# Patient Record
Sex: FEMALE | Race: WHITE | NOT HISPANIC OR LATINO | URBAN - METROPOLITAN AREA
[De-identification: names, ages, dates, MRNs, and addresses within clinical notes are randomized per-mention and may not be internally consistent; named-entity substitution may affect disease eponyms.]

---

## 2018-06-27 ENCOUNTER — APPOINTMENT (RX ONLY)
Dept: URBAN - METROPOLITAN AREA CLINIC 51 | Facility: CLINIC | Age: 38
Setting detail: DERMATOLOGY
End: 2018-06-27

## 2018-06-27 DIAGNOSIS — I78.8 OTHER DISEASES OF CAPILLARIES: ICD-10-CM

## 2018-06-27 DIAGNOSIS — L71.8 OTHER ROSACEA: ICD-10-CM

## 2018-06-27 PROBLEM — J30.1 ALLERGIC RHINITIS DUE TO POLLEN: Status: ACTIVE | Noted: 2018-06-27

## 2018-06-27 PROBLEM — E03.9 HYPOTHYROIDISM, UNSPECIFIED: Status: ACTIVE | Noted: 2018-06-27

## 2018-06-27 PROBLEM — L20.84 INTRINSIC (ALLERGIC) ECZEMA: Status: ACTIVE | Noted: 2018-06-27

## 2018-06-27 PROCEDURE — 99201: CPT

## 2018-06-27 PROCEDURE — ? COUNSELING

## 2018-06-27 ASSESSMENT — LOCATION SIMPLE DESCRIPTION DERM
LOCATION SIMPLE: RIGHT CHEEK
LOCATION SIMPLE: LEFT FOREHEAD

## 2018-06-27 ASSESSMENT — LOCATION ZONE DERM: LOCATION ZONE: FACE

## 2018-06-27 ASSESSMENT — LOCATION DETAILED DESCRIPTION DERM
LOCATION DETAILED: LEFT FOREHEAD
LOCATION DETAILED: RIGHT CENTRAL MALAR CHEEK

## 2018-06-27 ASSESSMENT — SEVERITY ASSESSMENT OVERALL AMONG ALL PATIENTS
IN YOUR EXPERIENCE, AMONG ALL PATIENTS YOU HAVE SEEN WITH THIS CONDITION, HOW SEVERE IS THIS PATIENT'S CONDITION?: MODERATE

## 2022-06-21 ENCOUNTER — HOSPITAL ENCOUNTER (OUTPATIENT)
Dept: HOSPITAL 75 - PREOP | Age: 42
Discharge: HOME | End: 2022-06-21
Attending: SURGERY
Payer: COMMERCIAL

## 2022-06-21 VITALS — BODY MASS INDEX: 27.32 KG/M2 | HEIGHT: 65.98 IN | WEIGHT: 169.98 LBS

## 2022-06-21 DIAGNOSIS — Z01.818: Primary | ICD-10-CM

## 2022-06-22 ENCOUNTER — HOSPITAL ENCOUNTER (OUTPATIENT)
Dept: HOSPITAL 75 - ENDO | Age: 42
Discharge: HOME | End: 2022-06-22
Attending: SURGERY
Payer: COMMERCIAL

## 2022-06-22 VITALS — DIASTOLIC BLOOD PRESSURE: 54 MMHG | SYSTOLIC BLOOD PRESSURE: 93 MMHG

## 2022-06-22 VITALS — SYSTOLIC BLOOD PRESSURE: 113 MMHG | DIASTOLIC BLOOD PRESSURE: 78 MMHG

## 2022-06-22 VITALS — BODY MASS INDEX: 27.32 KG/M2 | WEIGHT: 169.98 LBS | HEIGHT: 65.98 IN

## 2022-06-22 VITALS — DIASTOLIC BLOOD PRESSURE: 87 MMHG | SYSTOLIC BLOOD PRESSURE: 124 MMHG

## 2022-06-22 VITALS — DIASTOLIC BLOOD PRESSURE: 57 MMHG | SYSTOLIC BLOOD PRESSURE: 98 MMHG

## 2022-06-22 VITALS — SYSTOLIC BLOOD PRESSURE: 98 MMHG | DIASTOLIC BLOOD PRESSURE: 63 MMHG

## 2022-06-22 DIAGNOSIS — K29.30: ICD-10-CM

## 2022-06-22 DIAGNOSIS — K44.9: ICD-10-CM

## 2022-06-22 DIAGNOSIS — K31.89: Primary | ICD-10-CM

## 2022-06-22 DIAGNOSIS — K21.00: ICD-10-CM

## 2022-06-22 DIAGNOSIS — Z79.899: ICD-10-CM

## 2022-06-22 DIAGNOSIS — K57.30: ICD-10-CM

## 2022-06-22 PROCEDURE — 84703 CHORIONIC GONADOTROPIN ASSAY: CPT

## 2022-06-22 NOTE — DISCHARGE INST-SURGICAL
D/C Lap Instructions-AYANA


Follow Up





Activity as tolerated








High Fiber Diet 25g or more per day





Avoid Alcohol, Caffeine, Spicy Greasy and Acid foods.





Drink 64 fluid oz or more of fluids per day.





Symptoms to Report: Fever over 101 degree F, Nausea/Vomiting 


If any problems/questions: Contact your physician or go to Emergency Room











BLAISE PIÑA MD                Jun 22, 2022 12:10

## 2022-06-22 NOTE — ANESTHESIA-GENERAL POST-OP
MAC


Patient Condition


Mental Status/LOC:  Same as Preop


Cardiovascular:  Satisfactory


Nausea/Vomiting:  Absent


Respiratory:  Satisfactory


Pain:  Controlled


Complications:  Absent





Post Op Complications


Complications


None





Follow Up Care/Instructions


Patient Instructions


None needed.





Anesthesiology Discharge Order


Discharge Order


Patient is doing well, no complaints, stable vital signs, no apparent adverse 

anesthesia problems.   


No complications reported per nursing.











LATISHA MESSER CRNA              Jun 22, 2022 14:20

## 2022-06-22 NOTE — HISTORY AND PHYSICAL
DATE OF SERVICE:  



DATE OF ADMISSION:

06/22/2022.



ATTENDING PRIMARY CARE PHYSICIAN:

Angelita Rose DO.



HISTORY OF PRESENT ILLNESS:

The patient is a 41-year-old female who presented to [facility] with a 2-day

history of right lower abdominal quadrant pain.  She reports that this increased

in severity over time and she thought that this could be appendicitis.  A CT

scan was performed, which did show a noncomplicated diverticulitis around the

transverse colon.  She also had a mildly elevated white count of 12,000.  She

was admitted and started on IV antibiotics for approximately 48 hours and did

well and was discharged home.  She has not had a colonoscopy up to this point in

her life.  Upon further questioning, she also reports that she has had a history

of gastroesophageal reflux disease for many years; however, this has also been

worsening and states that she does have frequent episodes of belching as well as

a burning pain in the epigastric region.



PAST MEDICAL HISTORY:

Hypothyroid.



PAST SURGICAL HISTORY:

ORIF right wrist.



ALLERGIES:

CODEINE.



MEDICATIONS:

Levothyroxine 75 mcg daily, ciprofloxacin, and Flagyl b.i.d.



SOCIAL HISTORY:

Negative smoke and negative alcohol.



FAMILY HISTORY:

Noncontributory.



REVIEW OF SYSTEMS:

A well-nourished female, currently in no acute distress.  She is not

experiencing any shortness of breath or difficulty breathing.  No chest pain,

palpitations or diaphoresis.  No nausea, vomiting with a history of pain in the

right lower abdominal quadrant approximately two weeks ago; however, after

treatment, this has gone away completely.  She does not report any red blood per

rectum nor any dark tarry stools.  She also does not report any nausea or

vomiting; however, does have frequent episodes of reflux with epigastric burning

sensation.  No hematemesis and no coffee ground emesis.  No fever, chills, no

recent inadvertent weight loss.  All other review of systems negative.



PHYSICAL EXAMINATION:

CHEST:  Clear.  Good breath sounds bilaterally.

HEART:  Regular, no murmurs.

EXTREMITIES:  No lower extremity edema and negative Homans sign.

HEENT:  No scleral icterus.

NECK:  No cervical lymphadenopathy.

ABDOMEN:  Soft, nontender, and nondistended.

SKIN:  Warm and dry.



ASSESSMENT AND PLAN:

A 41-year-old female with history of uncomplicated transverse diverticulitis as

well as a history of gastroesophageal reflux disease with worsening

symptomatology.  We will schedule her for followup colonoscopy as well as EGD.





Job ID: [id number]

DocumentID: [id number]

Dictated Date:  06/14/2022 15:28:56

Transcription Date: 06/14/2022 15:36:32

Dictated By: BLAISE PIÑA MD

## 2022-06-22 NOTE — HISTORY AND PHYSICAL
DATE OF SERVICE:  



DATE OF ADMISSION:

06/22/2022



HISTORY OF PRESENT ILLNESS:

The patient is a 41-year-old female who presented to [facility] on

06/01/2022 with a 2-day history of right-sided abdominal pain.  A CT scan was

performed, which was consistent with a transverse colonic diverticulitis.  She

also did have a slight elevation of white count at 12,000.  She was admitted,

placed on IV antibiotics and did slightly improve and was able to tolerate a

diet and have bowel function.  Upon further questioning, she reports that her

last colonoscopy was in 2009 and only hemorrhoids were identified.  Since that

time, she states that the pain has improved.  She is in need of a followup

colonoscopy.  She has also been having intermittent episodes of gastroesophageal

reflux disease, which was initially mild; however, has worsened in the past

year.



PAST MEDICAL HISTORY:

Hypothyroid.



PAST SURGICAL HISTORY:

ORIF right radius and ulna 2010.



ALLERGIES:

CODEINE.



MEDICATIONS:

Levothyroxine 75 mcg daily, omeprazole p.r.n.



SOCIAL HISTORY:

Negative smoke, negative alcohol.



FAMILY HISTORY:

Noncontributory.



VITAL SIGNS:  Blood pressure 128/86, weight 176.9 pounds at 5 feet 7 inches.



REVIEW OF SYSTEMS:

Well-nourished female currently in no acute distress.  She is not experiencing

any shortness of breath or difficulty breathing.  No chest pain, palpitations,

diaphoresis.  Intermittent episodes of epigastric burning sensation as well as

crampy pain.  She also had pain in the right lower abdominal quadrant, which

persisted for approximately two days and was admitted, placed on IV antibiotics

and CT scan consistent with transverse colonic diverticulitis.  No red blood per

rectum, no dark tarry stools.  No major issues with diarrhea nor constipation. 

No fever, chills, no recent inadvertent weight loss.  All other review of

systems negative.



PHYSICAL EXAMINATION:

CHEST:  Clear.  Good breath sounds bilaterally.

HEART:  Regular, no murmurs.

EXTREMITIES:  No lower extremity edema, negative Homans sign.

HEENT:  No scleral icterus.

NECK:  No cervical lymphadenopathy.

ABDOMEN:  Soft, nontender, nondistended.

SKIN:  Warm, dry.



ASSESSMENT AND PLAN:

A 41-year-old female with history of transverse colonic diverticulitis as well

as worsening gastroesophageal reflux disease.  She has improved with medical

management and will require a followup colonoscopy to rule out any other

etiology for the inflammation as well as an EGD for her worsening

gastroesophageal reflux disease, which we will schedule.





Job ID: [id number]

DocumentID: [id number]

Dictated Date:  06/20/2022 21:02:33

Transcription Date: 06/20/2022 23:32:33

Dictated By: BLAISE PIÑA MD

## 2022-06-22 NOTE — PROGRESS NOTE-PRE OPERATIVE
Pre-Operative Progress Note


H&P Reviewed


The H&P was reviewed, patient examined and no changes noted.


Date Seen by Provider:  Jun 22, 2022


Time Seen by Provider:  12:00


Date H&P Reviewed:  Jun 22, 2022


Time H&P Reviewed:  12:00


Pre-Operative Diagnosis:  hx transverse colitis.











BLAISE PIÑA MD                Jun 22, 2022 12:09

## 2022-06-22 NOTE — OPERATIVE REPORT
DATE OF SERVICE:  06/22/2022



ATTENDING PRIMARY CARE PHYSICIAN:

Dr. Angeliat Rose.



PREOPERATIVE DIAGNOSES:

History of transverse colonic diverticulitis, gastroesophageal reflux disease.



POSTOPERATIVE DIAGNOSES:

Reflux esophagitis, [place] grade B, small hiatal hernia approximately 2 cm

in size, mild gastritis, mild pandiverticulosis, small ulceration of sigmoid

colon.



PROCEDURE:

EGD with biopsy, colonoscopy with biopsy.



SURGEON:

Blaise Bray MD



ANESTHESIA:

Monitored anesthesia care.



ESTIMATED BLOOD LOSS:

Minimal.



FINDINGS:

Same as postoperative diagnosis.



DISPOSITION:

The patient tolerated the procedure well.



INDICATIONS:

The patient is a 41-year-old female who presented to [facility] on

06/01/2022 with a 2-day history of right-sided abdominal pain.  A CT scan was

performed and was read as transverse colonic diverticulitis.  She also did have

a slight elevation of white count at 12,000.  She was admitted, placed on IV

antibiotics with improvement in her symptoms.  Upon further questioning, she did

report that she had a colonoscopy in 2009 and there were some mild hemorrhoids

identified, however, nothing else.  She also reports a history of

gastroesophageal reflux disease, which was initially mild; however, has worsened

in the past year.



DESCRIPTION OF PROCEDURE:

The patient was brought to the endoscopy suite, laid in the left lateral

decubitus position.  After adequate IV pain and sedative medications and

monitored anesthesia care, the mouthpiece was applied.



The endoscope was placed in the mouth, visualizing the pharynx and

hypopharyngeal region.  Vocal cords, epiglottis and vallecula identified and

appeared to be normal.  The endoscope was then gently intubated the esophageal

opening and esophagus insufflated.  The endoscope was then advanced through the

first, second and third portion of esophagus at the level of the GE junction,

reflux esophagitis, [place] grade B identified.  No ulcers or strictures and

a biopsy was taken with visualization of good hemostasis.



The endoscope was then advanced in the stomach.  The endoscope retroflexed,

visualizing a small type 1 hiatal hernia approximately 2 cm in size.  There was

a mild gastritis.  No ulcers, polyps, or any neoplasms and a biopsy was taken of

the antrum to rule out H. pylori with visualization of good hemostasis.  The

endoscope was then advanced to the pylorus and the first and second portion of

the duodenum, which appeared normal with no distal obstructions.  The endoscope

was then slowly withdrawn while taking a second look and suctioning of residual

air with no additional findings.



A digital rectal examination was performed, which revealed chronic stage II

external and internal hemorrhoids.  Normal sphincter tone was felt and there

were no palpable masses.



The endoscope was then intubated into the anus and rectum gently insufflated. 

The endoscope was then advanced through the valves of Costa of the rectum with

no polyps or any neoplasms identified.  Through the sigmoid colon, there was a

mild diverticulosis, which eventually did continue throughout the rest of the

colon consistent with a pandiverticulosis.  There was a solitary ulcerative

plaque identified of the sigmoid colon, which was biopsied.  This may indicate

some low level of inflammatory bowel disease.  The endoscope was then advanced

through the remainder of the descending, transverse and ascending colon to the

cecum, where again a mild pandiverticulosis identified with no mucosal

inflammatory changes identified.  There were no polyps or any neoplasms

identified as well.  The endoscope was then slowly withdrawn while taking a

second look and suctioning of residual air with no additional findings.



The patient tolerated the procedure well.  We will recommend the necessary

lifestyle and dietary accommodation including small and more frequent meals,

avoiding to eating at night as well as head elevation while lying supine.  She

also needs to avoid caffeinated beverages, spicy, greasy and acidic foods.  We

will also start her on Protonix 40 mg to be taken on a scheduled basis.  We will

also recommend a high-fiber diet with addition of a fiber supplement, which

should equal or exceed 25 grams daily as well as significant amounts of water to

promote soft stools on a daily basis.  If she were to have another episode of

abdominal pain and colitis once again identified, this would be likely more

consistent with an inflammatory bowel disease, we will recommend again followup

colonoscopy with biopsies as appropriate as well as a referral to a

gastroenterologist for long-term medical management.





Job ID: [id number]

DocumentID: [id number]

Dictated Date:  06/22/2022 14:21:56

Transcription Date: 06/22/2022 23:18:48

Dictated By: BLAISE BRAY MD